# Patient Record
Sex: FEMALE | Race: WHITE | NOT HISPANIC OR LATINO | Employment: FULL TIME | URBAN - METROPOLITAN AREA
[De-identification: names, ages, dates, MRNs, and addresses within clinical notes are randomized per-mention and may not be internally consistent; named-entity substitution may affect disease eponyms.]

---

## 2024-05-20 ENCOUNTER — APPOINTMENT (OUTPATIENT)
Dept: GENERAL RADIOLOGY | Facility: HOSPITAL | Age: 29
End: 2024-05-20

## 2024-05-20 ENCOUNTER — HOSPITAL ENCOUNTER (EMERGENCY)
Facility: HOSPITAL | Age: 29
Discharge: HOME | End: 2024-05-20
Attending: EMERGENCY MEDICINE | Admitting: EMERGENCY MEDICINE

## 2024-05-20 VITALS
HEIGHT: 71 IN | BODY MASS INDEX: 18.48 KG/M2 | OXYGEN SATURATION: 99 % | RESPIRATION RATE: 18 BRPM | HEART RATE: 96 BPM | TEMPERATURE: 98.4 F | SYSTOLIC BLOOD PRESSURE: 113 MMHG | WEIGHT: 132 LBS | DIASTOLIC BLOOD PRESSURE: 79 MMHG

## 2024-05-20 DIAGNOSIS — S93.602A FOOT SPRAIN, LEFT, INITIAL ENCOUNTER: Primary | ICD-10-CM

## 2024-05-20 PROCEDURE — 73630 X-RAY EXAM OF FOOT: CPT

## 2024-05-20 PROCEDURE — 99283 EMERGENCY DEPT VISIT LOW MDM: CPT

## 2024-05-20 RX ORDER — IBUPROFEN 600 MG/1
600 TABLET ORAL EVERY 6 HOURS PRN
Qty: 30 TABLET | Refills: 0 | Status: SHIPPED | OUTPATIENT
Start: 2024-05-20

## 2024-05-20 RX ORDER — FLUTICASONE PROPIONATE 50 MCG
2 SPRAY, SUSPENSION (ML) NASAL DAILY
COMMUNITY

## 2024-05-20 RX ORDER — ACETAMINOPHEN 500 MG
1000 TABLET ORAL ONCE
Status: COMPLETED | OUTPATIENT
Start: 2024-05-20 | End: 2024-05-20

## 2024-05-20 RX ADMIN — ACETAMINOPHEN 1000 MG: 500 TABLET ORAL at 18:50

## 2024-05-20 NOTE — Clinical Note
[facility] EMERGENCY DEPARTMENT  [address]  [facility] [address]  Phone: [phone]    Jami Kc was seen and treated in our emergency department on 5/20/2024.  She may return to work on 05/23/2024.  Patient can return to work on 5/23/24. Patient can be on light duty until she is able to bear weight on her foot.       Thank you for choosing [facility].    Chana Masters RN

## 2024-05-20 NOTE — DISCHARGE INSTRUCTIONS
Follow-up with a primary care provider.  Call patient connection number to establish with a primary care provider.  Follow-up with Dr. Gill if your symptoms not improve with RICE.  Use Tylenol or ibuprofen as needed for pain.  Return to emergency department for any worsening symptoms.

## 2024-05-20 NOTE — ED PROVIDER NOTES
EMERGENCY DEPARTMENT ENCOUNTER  Room Number:  37/37  PCP: Provider, No Known  Independent Historians: Patient      HPI:  Chief Complaint: had concerns including Foot Pain.     A complete HPI/ROS/PMH/PSH/SH/FH are unobtainable due to: None    Chronic or social conditions impacting patient care (Social Determinants of Health): None      Context: Jami Kc is a 28 y.o. female with a medical history of ectopic pregnancy who presents to the ED c/o acute left foot pain.  Patient reports that she was in bed this morning when her  climbed on the bed to find something.  He accidentally landed on her foot, causing her to hyper plantarflexed.  She reports pain along the lateral aspect of her foot.  She denies ankle or shin pain.  No other systemic complaints at this time.      Review of prior external notes (non-ED) -and- Review of prior external test results outside of this encounter:  Patient seen at urgent care on 2/10/2024 for otitis media.  Patient prescribed cefdinir.  Reviewed labs collected on 6/21/2023.  CBC with hemoglobin 13.0, CMP with creatinine 0.82.    Prescription drug monitoring program review:     N/A    PAST MEDICAL HISTORY  Active Ambulatory Problems     Diagnosis Date Noted    No Active Ambulatory Problems     Resolved Ambulatory Problems     Diagnosis Date Noted    No Resolved Ambulatory Problems     No Additional Past Medical History         PAST SURGICAL HISTORY  Past Surgical History:   Procedure Laterality Date    ECTOPIC PREGNANCY Right 05/2023         FAMILY HISTORY  History reviewed. No pertinent family history.      SOCIAL HISTORY  Social History     Socioeconomic History    Marital status: Single   Tobacco Use    Smoking status: Never   Vaping Use    Vaping status: Every Day    Substances: Nicotine, CBD    Devices: Disposable   Substance and Sexual Activity    Alcohol use: Yes     Comment: Socially         ALLERGIES  Patient has no known allergies.      REVIEW OF SYSTEMS  Review  of Systems   Constitutional:  Negative for chills and fever.   HENT:  Negative for ear pain and sore throat.    Respiratory:  Negative for cough and shortness of breath.    Cardiovascular:  Negative for chest pain and palpitations.   Gastrointestinal:  Negative for abdominal pain and vomiting.   Genitourinary:  Negative for dysuria and hematuria.   Musculoskeletal:  Positive for arthralgias. Negative for joint swelling.   Skin:  Negative for pallor and rash.   Neurological:  Negative for numbness and headaches.   Psychiatric/Behavioral:  Negative for confusion and hallucinations.      Included in HPI  All systems reviewed and negative except for those discussed in HPI.      PHYSICAL EXAM    I have reviewed the triage vital signs and nursing notes.    ED Triage Vitals   Temp Heart Rate Resp BP SpO2   05/20/24 1712 05/20/24 1712 05/20/24 1712 05/20/24 1714 05/20/24 1712   98.4 °F (36.9 °C) 96 18 119/79 99 %      Temp src Heart Rate Source Patient Position BP Location FiO2 (%)   05/20/24 1712 -- -- -- --   Tympanic           Physical Exam  Constitutional:       General: She is not in acute distress.     Appearance: She is well-developed.   HENT:      Head: Normocephalic and atraumatic.   Eyes:      Extraocular Movements: Extraocular movements intact.   Cardiovascular:      Rate and Rhythm: Normal rate and regular rhythm.      Heart sounds: Normal heart sounds.   Pulmonary:      Effort: Pulmonary effort is normal.      Breath sounds: Normal breath sounds.   Abdominal:      General: There is no distension.   Feet:      Comments: 2+ left DP pulse.  There is tenderness along the fifth metatarsal of the left foot.  Pain elicited with plantarflexion.  Skin:     General: Skin is warm.   Neurological:      General: No focal deficit present.      Mental Status: She is alert and oriented to person, place, and time.   Psychiatric:         Mood and Affect: Mood normal.             RADIOLOGY  XR Foot 3+ View Left    Result Date:  5/20/2024  XR FOOT 3+ VW LEFT-5/20/2024  HISTORY: Foot pain. Foot injury.  No acute bone, joint or soft tissue abnormalities are seen.   This report was finalized on 5/20/2024 5:56 PM by Dr. Yayo Ambrocio M.D on Workstation: [id number]         MEDICATIONS GIVEN IN ER  Medications   acetaminophen (TYLENOL) tablet 1,000 mg (has no administration in time range)         ORDERS PLACED DURING THIS VISIT:  Orders Placed This Encounter   Procedures    [facility] 11.  Crutches; Prevents Accomplishing MRADLs; Able to Safely Use Equipment; Mobility Deficit Can Be Sufficiently Resolved By Use of Equipment    XR Foot 3+ View Left         OUTPATIENT MEDICATION MANAGEMENT:  Current Facility-Administered Medications Ordered in Epic   Medication Dose Route Frequency Provider Last Rate Last Admin    acetaminophen (TYLENOL) tablet 1,000 mg  1,000 mg Oral Once Neville, Fallon WALTER PA-C         Current Outpatient Medications Ordered in Epic   Medication Sig Dispense Refill    fluticasone (FLONASE) 50 MCG/ACT nasal spray 2 sprays into the nostril(s) as directed by provider Daily.      cefdinir (OMNICEF) 300 MG capsule Take 1 capsule by mouth 2 (Two) Times a Day. 20 capsule 0    ibuprofen (ADVIL,MOTRIN) 600 MG tablet Take 1 tablet by mouth Every 6 (Six) Hours As Needed for Mild Pain or Moderate Pain. 30 tablet 0    promethazine-dextromethorphan (PROMETHAZINE-DM) 6.25-15 MG/5ML syrup 5 ml PO Q 4-6 hours prn cough.  Max:  30 ml per 24 hours. 240 mL 0           PROGRESS, DATA ANALYSIS, CONSULTS, AND MEDICAL DECISION MAKING  All labs have been independently interpreted by me.  All radiology studies have been reviewed by me. All EKG's have been independently viewed and interpreted by me.  Discussion below represents my analysis of pertinent findings related to patient's condition, differential diagnosis, treatment plan and final disposition.    Differential diagnosis includes but is not limited to foot sprain, metatarsal  fracture, foot contusion.        ED Course as of 05/20/24 1815   Mon May 20, 2024   1801 X-ray of left foot independently inter by me as no gross fracture [MP]   1807 Patient presents emergency department with left foot pain.  Worked up with x-ray which is negative for fracture.  Suspect she has a sprain.  Will place patient in Ace wrap and provided with crutches to weight-bear as tolerated.  Encouraged NSAIDs and RICE. [MP]      ED Course User Index  [MP] Fallon Lozada PA-C             AS OF 18:15 EDT VITALS:    BP - 113/79  HR - 96  TEMP - 98.4 °F (36.9 °C) (Tympanic)  O2 SATS - 99%    COMPLEXITY OF CARE  Admission was considered but after careful review of the patient's presentation, physical examination, diagnostic results, and response to treatment the patient may be safely discharged with outpatient follow-up.      DIAGNOSIS  Final diagnoses:   Foot sprain, left, initial encounter         DISPOSITION  ED Disposition       ED Disposition   Discharge    Condition   Stable    Comment   --                Please note that portions of this document were completed with a voice recognition program.    Note Disclaimer: At [facility], we believe that sharing information builds trust and better relationships. You are receiving this note because you recently visited [facility]. It is possible you will see health information before a provider has talked with you about it. This kind of information can be easy to misunderstand. To help you fully understand what it means for your health, we urge you to discuss this note with your provider.         Fallon Lozada PA-C  05/20/24 1816

## 2024-05-20 NOTE — ED PROVIDER NOTES
MD ATTESTATION NOTE    The QIANA and I have discussed this patient's history, physical exam, and treatment plan.  I have reviewed the documentation and personally had a face to face interaction with the patient. I affirm the documentation and agree with the treatment and plan.  The attached note describes my personal findings.      SHARED VISIT: This visit was performed by BOTH a physician and an APC. The substantive portion of the medical decision making was performed by this attesting physician who made or approved the management plan and takes responsibility for patient management. All studies in the APC note (if performed) were independently interpreted by me.      Brief HPI: Patient presents for evaluation of left foot pain.  Patient was in bed and significant other landed on her foot with his knee.  Patient has been having pain with ambulation since then.  No prior injury    PHYSICAL EXAM  ED Triage Vitals   Temp Heart Rate Resp BP SpO2   05/20/24 1712 05/20/24 1712 05/20/24 1712 05/20/24 1714 05/20/24 1712   98.4 °F (36.9 °C) 96 18 119/79 99 %      Temp src Heart Rate Source Patient Position BP Location FiO2 (%)   05/20/24 1712 -- -- -- --   Tympanic             GENERAL: no acute distress  HENT: nares patent  EYES: no scleral icterus  RESPIRATORY: normal effort  MUSCULOSKELETAL: no deformity  NEURO: alert, moves all extremities, follows commands  PSYCH:  calm, cooperative  SKIN: warm, dry    Vital signs and nursing notes reviewed.    X-ray foot independently interpreted by me and shows no evidence of fracture      Plan: Discharge       Kennedy Brooks MD  05/20/24 5538

## 2024-05-23 ENCOUNTER — OFFICE VISIT (OUTPATIENT)
Dept: FAMILY MEDICINE CLINIC | Facility: CLINIC | Age: 29
End: 2024-05-23

## 2024-05-23 VITALS
DIASTOLIC BLOOD PRESSURE: 72 MMHG | HEART RATE: 80 BPM | HEIGHT: 71 IN | BODY MASS INDEX: 19.1 KG/M2 | WEIGHT: 136.4 LBS | OXYGEN SATURATION: 98 % | TEMPERATURE: 99.2 F | SYSTOLIC BLOOD PRESSURE: 110 MMHG | RESPIRATION RATE: 16 BRPM

## 2024-05-23 DIAGNOSIS — S99.922D FOOT INJURY, LEFT, SUBSEQUENT ENCOUNTER: Primary | ICD-10-CM

## 2024-05-23 NOTE — PROGRESS NOTES
"    Chief Complaint   Patient presents with    Hospital Follow Up Visit     ER f/u from ankle sprain, still having really sharp pain, worried if there is something torn that couldn't be seen on the Xray, still can't walk on it    History of Present Illness:  Patient is a 28 year old female who present to the clinic for hospital follow up after an ER visit for left foot injury. 3 days ago she had an accident at home that resulted to an injury on her left foot. X-ray showed no acute fracture or dislocation. She is still experiencing sharp pain on the dorsal surface of her foot that radiates down to the toes and sometimes up to distal leg. Still not able to ambulate well or put weight on the foot due to pain. Has been applying ice and ACE bandage compression. Swelling seems to be reducing. Currently using clutches.      Outpatient Medications Prior to Visit   Medication Sig Dispense Refill    fluticasone (FLONASE) 50 MCG/ACT nasal spray 2 sprays into the nostril(s) as directed by provider Daily.      ibuprofen (ADVIL,MOTRIN) 600 MG tablet Take 1 tablet by mouth Every 6 (Six) Hours As Needed for Mild Pain or Moderate Pain. 30 tablet 0    cefdinir (OMNICEF) 300 MG capsule Take 1 capsule by mouth 2 (Two) Times a Day. 20 capsule 0    promethazine-dextromethorphan (PROMETHAZINE-DM) 6.25-15 MG/5ML syrup 5 ml PO Q 4-6 hours prn cough.  Max:  30 ml per 24 hours. 240 mL 0     No facility-administered medications prior to visit.      No Known Allergies  Past Surgical History:   Procedure Laterality Date    ECTOPIC PREGNANCY Right 05/2023     family history is not on file.   reports that she has never smoked. She has never been exposed to tobacco smoke. She has never used smokeless tobacco. She reports current alcohol use. No history on file for drug use.     /72 (BP Location: Right arm, Patient Position: Sitting, Cuff Size: Adult)   Pulse 80   Temp 99.2 °F (37.3 °C) (Oral)   Resp 16   Ht 180.3 cm (70.98\")   Wt 61.9 kg " (136 lb 6.4 oz)   LMP 04/21/2024   SpO2 98%   BMI 19.03 kg/m²   Physical Exam  Musculoskeletal:      Right foot: Normal.      Left foot: Normal range of motion. Swelling and tenderness present. No deformity. Normal pulse.        Legs:       Comments: Tenderness on palpation of the dorsal surface of the foot. Reduced swelling & redness. Able to wiggle toes, plantar flex and dorsiflex the ankle.   Neurological:      Sensory: Sensation is intact.      Motor: Motor function is intact.          The following data was reviewed by: Letty Bradford MD on 05/23/2024:    Data reviewed : Radiologic studies left foot X-ray showed no acute bone, joint or soft tissue abnormalities      Diagnoses and all orders for this visit:    1. Foot injury, left, subsequent encounter (Primary)    Clinically improving  Advice to avoid provocative activities  Encourage early mobilization  Continue on Ibuprofen PRN  Apply Ice compression intermittently  RTC if symptoms persist          Return for Annual physical.

## 2024-05-23 NOTE — PATIENT INSTRUCTIONS
It was nice meeting you today. I look forward to working with you on a plan to keep you healthy and happy!

## 2024-05-23 NOTE — LETTER
May 23, 2024     Patient: Jami Kc   YOB: 1995   Date of Visit: 5/23/2024       To Whom It May Concern:    Jami Kc was seen in my office today. It is my medical opinion that she may return to work in three days to allow for full recovery.            Sincerely,        Letty Bradford MD    CC: No Recipients

## 2024-05-30 ENCOUNTER — OFFICE VISIT (OUTPATIENT)
Dept: FAMILY MEDICINE CLINIC | Facility: CLINIC | Age: 29
End: 2024-05-30

## 2024-05-30 VITALS
OXYGEN SATURATION: 96 % | WEIGHT: 136 LBS | RESPIRATION RATE: 16 BRPM | DIASTOLIC BLOOD PRESSURE: 60 MMHG | HEIGHT: 71 IN | SYSTOLIC BLOOD PRESSURE: 104 MMHG | HEART RATE: 93 BPM | TEMPERATURE: 98.7 F | BODY MASS INDEX: 19.04 KG/M2

## 2024-05-30 DIAGNOSIS — J06.9 URI, ACUTE: Primary | ICD-10-CM

## 2024-05-30 NOTE — LETTER
May 30, 2024     Patient: Jami Kc   YOB: 1995   Date of Visit: 5/30/2024       To Whom It May Concern:    Jami Kc was seen in my office today due to illness for the past few days. It is my medical opinion that she may return to work tomorrow.         Sincerely,        Letty Bradford MD    CC: No Recipients

## 2024-06-04 ENCOUNTER — PATIENT MESSAGE (OUTPATIENT)
Dept: FAMILY MEDICINE CLINIC | Facility: CLINIC | Age: 29
End: 2024-06-04